# Patient Record
(demographics unavailable — no encounter records)

---

## 2025-05-16 NOTE — DISCUSSION/SUMMARY
[FreeTextEntry1] : The patient is a 53-year-old gentleman HLD with atypical chest pain.  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  #5 General- does all the maintenance at [facility], stressful week with upcoming [employer] festival. (Vicente) [EKG obtained to assist in diagnosis and management of assessed problem(s)] : EKG obtained to assist in diagnosis and management of assessed problem(s)

## 2025-05-16 NOTE — HISTORY OF PRESENT ILLNESS
[FreeTextEntry1] : Damion is a 53-year-old gentleman HLD s/p ER visit for chest pain and SOB. Cardiac Ct Normal and ECHO normal. He was having a nonstressful conversation when developed severe left chest stabbing type pain then radiated down left arm and tingling sensation. It resolved quickly then a while later had it again doing nothing and more severe lasting longer so went to ER. Nothing since.  Works at [email or web address].

## 2025-05-16 NOTE — HISTORY OF PRESENT ILLNESS
[FreeTextEntry1] : Damion is a 53-year-old gentleman HLD s/p ER visit for chest pain and SOB. Cardiac Ct Normal and ECHO normal. He was having a nonstressful conversation when developed severe left chest stabbing type pain then radiated down left arm and tingling sensation. It resolved quickly then a while later had it again doing nothing and more severe lasting longer so went to ER. Nothing since.  Works at [employer].

## 2025-05-16 NOTE — DISCUSSION/SUMMARY
[FreeTextEntry1] : The patient is a 53-year-old gentleman HLD with atypical chest pain.  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  #5 General- does all the maintenance at [facility], stressful week with upcoming [employer] festival. (Vicente) [EKG obtained to assist in diagnosis and management of assessed problem(s)] : EKG obtained to assist in diagnosis and management of assessed problem(s)

## 2025-06-11 NOTE — DISCUSSION/SUMMARY
[FreeTextEntry1] : The patient is a 53-year-old gentleman HLD with atypical chest pain that resolved  #1 CV- normal ECHO and cardiac CT, exercise stress test to evaluate for spasm was negative. #2 HLD- c/w rosuvastatin 40mg, Icosapent 4 gm, labs #3 Ortho- does have cervical spine disc disease but unclear if related #4 GI- character concerning for GI etiology, make not of his diet if recurs.  #5 General- does all the maintenance at [facility], stressful week with upcoming [employer] festival. (Vicente)

## 2025-06-11 NOTE — REVIEW OF SYSTEMS
Chronic Disease Management Services   Office Progress Note     Ralph Littlejohn is a 81 year old Black/ male presenting for a follow-up visit for Chronic Disease management of DM, HTN and Secondary prevention of ASCVD. Patient’s primary care provider is Meño Vann MD, last seen 11/10/22.    Supervising Provider: Meño Vann MD  Order Date: 02/01/21, 02/07/22    ER/Hospitalization History:   Has not been hospitalized since last visit    HPI:    General Symptoms: palpitations denies, HA denies, SOB denies, fatigue denies, dizziness denies, CP denies    DM Symptoms: polyuria denies, polydipsia denies, polyphagia denies, blurry vision denies, numbness/tingling denies     Medication Adherence:   · Medications reconciled per patient's memory  · Taken medications today? Yes  · Missed doses of medications: No  · Patient uses a pillbox? No  · Medication intolerances: Jardiance  · Medication allergies: aspirin (nosebleeds)    Diet/Exercise:  · Diet  · Eating a balanced diet: Yes  · Sodium restriction: Yes  · Exercise: Limited to activities of daily living   · Nicotine Use: Denies use    Routine Health Maintenance:  · Aspirin 81 mg daily for cardioprotection: not taking allergy reported  · Statin: taking  · ACEI/ARB: taking  · Vaccinations:  · Influenza: patient refused  · Pneumonia: patient refused  · COVID-19: not up to date, patient refused booster at today's visit    Immunization History   Administered Date(s) Administered   • COVID-19 12Y+ Pfizer-BioNt48domain - Do Not Dilute 05/18/2022   • COVID-19 12Y+ Pfizer-BioNtech - Requires Dilution 03/02/2021, 03/23/2021, 10/04/2021   • Influenza, seasonal, injectable, trivalent 11/17/2009, 11/02/2010       Vitals:There were no vitals taken for this visit.  BP Readings from Last 3 Encounters:   11/10/22 126/56   07/14/22 119/52   06/06/22 131/55     Pulse Readings from Last 3 Encounters:   11/10/22 60   07/14/22 (!) 48   06/06/22 (!) 59     Wt Readings from Last  3 Encounters:   11/10/22 66 kg   22 69.8 kg   22 68.9 kg       Current Outpatient Medications   Medication Instructions   • Alcohol Swabs (DropSafe Alcohol Prep) 70 % Pads USE AS DIRECTED TWICE DAILY TO CLEAN SKIN TO CHECK BLOOD SUGAR   • amLODIPine (NORVASC) 10 MG tablet TAKE 1 TABLET ONE TIME DAILY FOR HIGH BLOOD PRESSURE   • atorvastatin (LIPITOR) 20 MG tablet TAKE 1 TABLET EVERY DAY   • Blood Glucose Monitoring Suppl (True Metrix Meter) w/Device Kit USE AS DIRECTED   • brimonidine (ALPHAGAN) 0.2 % ophthalmic solution 2 drops, Left Eye, 2 TIMES DAILY   • chlorthalidone (THALITONE) 25 mg, Oral, DAILY   • glipiZIDE (GLUCOTROL XL) 2.5 mg, Oral, DAILY   • hydrALAZINE (APRESOLINE) 50 MG tablet TAKE 1 TABLET IN THE MORNING AND 1 TABLET AT NOON AND 1 TABLET BEFORE BEDTIME. (DOSE REDUCED)   • levETIRAcetam (KepPRA) 500 MG tablet TAKE 1 TABLET TWICE DAILY   • lisinopril (ZESTRIL) 20 MG tablet TAKE 1 TABLET EVERY DAY (DOSE DECREASE)   • metFORMIN (GLUCOPHAGE-XR) 500 MG 24 hr tablet TAKE 1 TABLET TWICE DAILY   • tamsulosin (FLOMAX) 0.4 MG Cap TAKE 1 CAPSULE EVERY DAY   • True Metrix Blood Glucose Test test strip TEST BLOOD SUGAR TWICE DAILY FOR TYPE 2 DIABETES   • TRUEplus Lancets 33G Misc TEST BLOOD SUGAR TWICE DAILY       Assessment/Plan:  DM  Goals: A1c <8% due to age and comorbidities, FPG  mg/dL due to age and comorbidities, 2hPPG <200 mg/dL   Lab Results   Component Value Date    HGBA1C 8.9 (H) 2022    HGBA1C 5.9 (H) 2022    CREATININE 1.79 (H) 2022    GFRESTIMATE 38 (L) 2022    MALBCR 341.1 (H) 2022     · SMBG: available to assess per patient, at goal  · Checking 1-2 times per day  · FP, avg 90-120s  mg/dL at goal  · PPG: avg 165-170, rarely 200s mg/dL  at goal  · Low BG: none reported   50 mg/dL x 1   · Aware of s/sx of hypoglycemia and how to treat: Yes   • In Office: mg/dL FPG not testing   · Dilated eye exam: up to date  · Foot exam: up to date  · Currently  taking metformin  mg twice daily and glipizide ER 2.5 mg daily  · CPM metformin, glipizide; pending A1c result  · Recommended to check SMBG every morning  · Repeat A1c, CMP today    HTN  Goal BP: <130/80 mmHg (2017 ACC/AHA Guidelines)   Lab Results   Component Value Date    POTASSIUM 4.9 07/14/2022    CREATININE 1.79 (H) 07/14/2022    GFRESTIMATE 38 (L) 07/14/2022     · BP today 126/56 mmHg, HR 48 bpm  · SMBP: per patient  · Checking 0 time(s) a day  · Home BP: mmHg not testing   · Currently taking amlodipine 10 mg daily, hydralazine 50 mg TID, chlorthalidone 25 mg daily, and lisinopril 20 mg daily  · CPM amlodipine, chlorthalidone, lisinopril, hydralazine  · Monitor BP and HR each visit   · Repeat CMP today    Lipids  ASCVD Risk Group: clinical ASCVD (hx of CVA in 1992)  Lab Results   Component Value Date    CHOLESTEROL 154 02/07/2022     02/07/2022    CALCLDL 21 02/07/2022    TRIGLYCERIDE 89 02/07/2022     Moderate intensity statin indicated   · Currently taking atorvastatin 20 mg daily  · CPM; LDL < 70 mg/dL at goal  · Repeat lipid panel 02/2023     Counseling provided at this visit:  Signs and symptoms of hypoglycemia, A1c and SMBG goals, Instructed patient to check FPG and 2hPPG after at least one meal and Eat a balanced diet that includes fruits, vegetables and whole grains    Pt was provided with an updated medication list with all current medications and instructions on how and what time to take.     Labs due:  A1C today, CMP today, Lipids 02/2023, Microalbumin 02/2023    Follow-up:   PharmD follow up: 02/16/23  PCP follow up: 02/16/23     Time Spent on Visit:  15 minutes were spent via face-to-face discussion related to the medical management of the patient.    Sydnee Driscoll PharmD, BCPS, BCACP  Clinical Pharmacist - Chronic Disease Management  [facility]    [Chest Discomfort] : chest discomfort [Negative] : Heme/Lymph [SOB] : no shortness of breath [Dyspnea on exertion] : not dyspnea during exertion [Lower Ext Edema] : no extremity edema [Palpitations] : no palpitations

## 2025-06-11 NOTE — HISTORY OF PRESENT ILLNESS
[FreeTextEntry1] : 53-year-old gentleman HLD with atypical chest pain. Stress test today to evaluate for vasospasm. No chest pain with excellent functional capacity. No further episodes at work either.

## 2025-06-11 NOTE — DISCUSSION/SUMMARY
[FreeTextEntry1] : The patient is a 53-year-old gentleman HLD with atypical chest pain that resolved  #1 CV- normal ECHO and cardiac CT, exercise stress test to evaluate for spasm was negative. #2 HLD- c/w rosuvastatin 40mg, Icosapent 4 gm, labs #3 Ortho- does have cervical spine disc disease but unclear if related #4 GI- character concerning for GI etiology, make not of his diet if recurs.  #5 General- does all the maintenance at [facility], stressful week with upcoming [employer] festival. (Vicente)